# Patient Record
Sex: FEMALE | Race: WHITE
[De-identification: names, ages, dates, MRNs, and addresses within clinical notes are randomized per-mention and may not be internally consistent; named-entity substitution may affect disease eponyms.]

---

## 2018-11-02 ENCOUNTER — HOSPITAL ENCOUNTER (OUTPATIENT)
Dept: HOSPITAL 80 - FSGY | Age: 38
Discharge: HOME | End: 2018-11-02
Attending: SURGERY
Payer: COMMERCIAL

## 2018-11-02 VITALS — SYSTOLIC BLOOD PRESSURE: 98 MMHG | DIASTOLIC BLOOD PRESSURE: 69 MMHG

## 2018-11-02 DIAGNOSIS — Z82.49: ICD-10-CM

## 2018-11-02 DIAGNOSIS — E03.9: ICD-10-CM

## 2018-11-02 DIAGNOSIS — Z88.0: ICD-10-CM

## 2018-11-02 DIAGNOSIS — K42.9: Primary | ICD-10-CM

## 2018-11-02 PROCEDURE — 0WQF0ZZ REPAIR ABDOMINAL WALL, OPEN APPROACH: ICD-10-PCS | Performed by: SURGERY

## 2018-11-02 NOTE — PDANEPAE
ANE History of Present Illness





Umbilicial hernia





ANE Past Medical History





- Cardiovascular History


Hx Hypertension: No


Hx Arrhythmias: No


Hx Chest Pain: No


Hx Coronary Artery / Peripheral Vascular Disease: No


Hx CHF / Valvular Disease: No


Hx Palpitations: No





- Pulmonary History


Hx COPD: No


Hx Asthma/Reactive Airway Disease: No


Hx Recent Upper Respiratory Infection: No


Hx Oxygen in Use at Home: No


Hx Sleep Apnea: No


Sleep Apnea Screening Result - Last Documented: Negative





- Neurologic History


Hx Cerebrovascular Accident: No


Hx Seizures: No


Hx Dementia: No





- Endocrine History


Hx Diabetes: No


Endocrine History Comment: hypothyroid





- Renal History


Hx Renal Disorders: No





- Liver History


Hx Hepatic Disorders: No





- Neurological & Psychiatric Hx


Hx Neurological and Psychiatric Disorders: Yes


Neurological / Psychiatric History Comment: mild anxiety





- Cancer History


Hx Cancer: No





- Congenital Disorder History


Hx Congenital Disorders: No





- GI History


Hx Gastrointestinal Disorders: No





- Other Health History


Other Health History: wears glasses





- Chronic Pain History


Chronic Pain: No





- Surgical History


Prior Surgeries: inguinal hernia repairs x 2.   x 2





ANE Review of Systems


Review of Systems: 








- Exercise capacity


METS (RN): 5 METS





ANE Patient History





- Allergies


Allergies/Adverse Reactions: 








gluten Allergy (Verified 14 10:47)


 


Milk Containing Products [dairy] Allergy (Verified 10/31/18 13:55)


 


Penicillins Allergy (Verified 10/31/18 13:55)


 had reaction as a small child, not sure reaction








- Home Medications


Home medications: home medication list seen and reviewed


Home Medications: 








Adult One Daily Multivit Tab  10/31/18 [Last Taken 10/30/18]


Fish Oil Omega-3 Softgel  10/31/18 [Last Taken 10/30/18]


Levothyroxine  10/31/18 [Last Taken 18 06:15]


Magnesium  10/31/18 [Last Taken 10/30/18]


Probiotic  10/31/18 [Last Taken 10/30/18]








- NPO status


NPO Since - Liquids (Date): 18


NPO Since - Liquids (Time): 06:15


NPO Since - Solids (Date): 18


NPO Since - Solids (Time): 23:30





- Anes Hx


Anes Hx: no prior problems





- Smoking Hx


Smoking Status: Never smoked





- Family Anes Hx


Family Hx Anesthesia Complications: none





ANE Labs/Vital Signs





- Vital Signs


Blood Pressure: 108/70


Heart Rate: 56


Respiratory Rate: 14


O2 Sat (%): 97


Height: 162.56 cm


Weight: 60.328 kg





ANE Physical Exam





- Airway


Neck exam: FROM


Mallampati Score: Class 1


Mouth exam: normal dental/mouth exam





- Pulmonary


Pulmonary: no respiratory distress





- Cardiovascular


Cardiovascular: regular rate and rhythym





- ASA Status


ASA Status: I





ANE Anesthesia Plan


Anesthesia Plan: MAC

## 2018-11-02 NOTE — POSTOPPROG
Post Op Note


Date of Operation: 11/02/18


Surgeon: Mary Kate Mitchell


Anesthesiologist: michelle


Anesthesia: IV Sedation


Pre-op Diagnosis: umbilical hernia


Post-op Diagnosis: same


Indication: 39 yo with recurrent umbilical hernia


Procedure: umbilical hernia repair 


Findings: 8 mm defect


Inf/Abcess present in the surg proc area at time of surgery?: No

## 2018-11-02 NOTE — GOP
DATE OF OPERATION:  2018



SURGEON:  Mary Kate Mitchell MD



ANESTHESIA:  Monitored anesthesia care with IV sedation.



ANESTHESIOLOGIST:  Mitch De La Rosa MD.



PREOPERATIVE DIAGNOSIS:  Recurrent umbilical hernia.



POSTOPERATIVE DIAGNOSIS:  Recurrent umbilical hernia.



PROCEDURE PERFORMED:  Umbilical hernia repair.



FINDINGS:  Defect approximately 8 mm.



SPECIMENS:  None.



INDICATIONS:  The patient is a 38-year-old who developed a hernia during pregnancy.  We attempted to 
repair during her , but that did not last.



DESCRIPTION OF PROCEDURE:  Patient was brought into the operating room and placed supine on the table
.  Monitored anesthesia care with IV sedation was performed.  Her abdomen was prepped and draped in u
sual sterile fashion.  Infiltrated all sites with 0.5% Marcaine and 1% lidocaine prior to making inci
sions.  I made an incision beneath her umbilicus.  I dissected down through the subcutaneous tissues.
  I came around the umbilical stalk with a hemostat and divided this from the fascia.  I was able to 
see the small defect.  It was approximately 8 mm.  I closed this with 0 Surgilon I recreated a angeles um
bilicus with 2-0 Vicryl.  Skin closed with Vicryl and 4-0 Monocryl, Mastisol, Steri-Strips, cotton ba
ll.  Sterile dressing applied.  She was awakened in the operating room and transferred to PACU in sta
ble condition.





Job #:  [id number]

## 2018-11-02 NOTE — POSTANESTH
Post Anesthetic Evaluation


Cardiovascular Status: Normal, Stable


Respiratory Status: Normal, Stable


Level of Consciousness/Mental Status: Alert and Oriented


Pain Control: Adequate, Prn Tx Ordered


Nausea/Vomiting Control: Adequate, Prn Tx Ordered


Complications Possibly Related to Anesthesia: None Noted